# Patient Record
Sex: FEMALE | Race: WHITE | NOT HISPANIC OR LATINO | URBAN - METROPOLITAN AREA
[De-identification: names, ages, dates, MRNs, and addresses within clinical notes are randomized per-mention and may not be internally consistent; named-entity substitution may affect disease eponyms.]

---

## 2018-03-30 ENCOUNTER — EMERGENCY (EMERGENCY)
Facility: HOSPITAL | Age: 23
LOS: 1 days | End: 2018-03-30
Payer: COMMERCIAL

## 2018-03-30 PROCEDURE — 71046 X-RAY EXAM CHEST 2 VIEWS: CPT | Mod: 26

## 2018-03-30 PROCEDURE — 99284 EMERGENCY DEPT VISIT MOD MDM: CPT

## 2018-12-30 ENCOUNTER — EMERGENCY (EMERGENCY)
Facility: HOSPITAL | Age: 23
LOS: 7 days | Discharge: ANOTHER HOSPITAL | End: 2019-01-07
Payer: MEDICAID

## 2018-12-30 PROCEDURE — 99285 EMERGENCY DEPT VISIT HI MDM: CPT

## 2018-12-31 ENCOUNTER — EMERGENCY (EMERGENCY)
Facility: HOSPITAL | Age: 23
LOS: 1 days | Discharge: LEFT BEFORE DISCHARGE | End: 2018-12-31
Attending: STUDENT IN AN ORGANIZED HEALTH CARE EDUCATION/TRAINING PROGRAM

## 2018-12-31 VITALS
DIASTOLIC BLOOD PRESSURE: 73 MMHG | RESPIRATION RATE: 18 BRPM | OXYGEN SATURATION: 97 % | HEART RATE: 96 BPM | SYSTOLIC BLOOD PRESSURE: 119 MMHG | TEMPERATURE: 98 F

## 2018-12-31 PROCEDURE — 76815 OB US LIMITED FETUS(S): CPT | Mod: 26

## 2018-12-31 PROCEDURE — 76770 US EXAM ABDO BACK WALL COMP: CPT | Mod: 26

## 2018-12-31 NOTE — ED PROVIDER NOTE - MEDICAL DECISION MAKING DETAILS
went to evaluate pt, pt was not in bed when I arrived to bedside. went to evaluate pt, pt was not in bed when I arrived to bedside. Spoke with Nurse Salima whom states the patient informed her that she has been pain free prior to transfer and just wants to go home and follow-up with ob/gyn within 24-48 hours. Patient declined awaiting to speak to me.

## 2018-12-31 NOTE — ED ADULT NURSE REASSESSMENT NOTE - NS ED NURSE REASSESS COMMENT FT1
Pt states "I don't want to wait here anymore, I waited at [facility] for over 5 hours and I just want to go home and sleep, im in no pain and I can handle this tomorrow", IV access removed, pt AOx4, ambulates steadily w/out assistance, MD Tejada aware, pt left w/ [facility] paperwork.

## 2023-08-25 PROBLEM — Z00.00 ENCOUNTER FOR PREVENTIVE HEALTH EXAMINATION: Status: ACTIVE | Noted: 2023-08-25

## 2023-09-28 ENCOUNTER — APPOINTMENT (OUTPATIENT)
Dept: RHEUMATOLOGY | Facility: CLINIC | Age: 28
End: 2023-09-28
Payer: MEDICAID

## 2023-09-28 ENCOUNTER — NON-APPOINTMENT (OUTPATIENT)
Age: 28
End: 2023-09-28

## 2023-09-28 VITALS
WEIGHT: 110 LBS | OXYGEN SATURATION: 98 % | DIASTOLIC BLOOD PRESSURE: 79 MMHG | BODY MASS INDEX: 16.67 KG/M2 | SYSTOLIC BLOOD PRESSURE: 115 MMHG | HEIGHT: 68 IN | RESPIRATION RATE: 16 BRPM | HEART RATE: 110 BPM

## 2023-09-28 DIAGNOSIS — G89.29 OTHER CHRONIC PAIN: ICD-10-CM

## 2023-09-28 DIAGNOSIS — M79.10 MYALGIA, UNSPECIFIED SITE: ICD-10-CM

## 2023-09-28 DIAGNOSIS — I95.1 ORTHOSTATIC HYPOTENSION: ICD-10-CM

## 2023-09-28 DIAGNOSIS — R10.9 UNSPECIFIED ABDOMINAL PAIN: ICD-10-CM

## 2023-09-28 DIAGNOSIS — S73.003A UNSPECIFIED SUBLUXATION OF UNSPECIFIED HIP, INITIAL ENCOUNTER: ICD-10-CM

## 2023-09-28 DIAGNOSIS — R55 DIZZINESS AND GIDDINESS: ICD-10-CM

## 2023-09-28 DIAGNOSIS — R42 DIZZINESS AND GIDDINESS: ICD-10-CM

## 2023-09-28 DIAGNOSIS — Z86.69 PERSONAL HISTORY OF OTHER DISEASES OF THE NERVOUS SYSTEM AND SENSE ORGANS: ICD-10-CM

## 2023-09-28 DIAGNOSIS — R29.898 OTHER SYMPTOMS AND SIGNS INVOLVING THE MUSCULOSKELETAL SYSTEM: ICD-10-CM

## 2023-09-28 DIAGNOSIS — Z87.2 PERSONAL HISTORY OF DISEASES OF THE SKIN AND SUBCUTANEOUS TISSUE: ICD-10-CM

## 2023-09-28 DIAGNOSIS — R70.0 ELEVATED ERYTHROCYTE SEDIMENTATION RATE: ICD-10-CM

## 2023-09-28 DIAGNOSIS — Z87.898 PERSONAL HISTORY OF OTHER SPECIFIED CONDITIONS: ICD-10-CM

## 2023-09-28 PROCEDURE — 99205 OFFICE O/P NEW HI 60 MIN: CPT | Mod: 25

## 2023-09-28 PROCEDURE — 36415 COLL VENOUS BLD VENIPUNCTURE: CPT

## 2023-09-28 RX ORDER — MIDODRINE HYDROCHLORIDE 5 MG/1
5 TABLET ORAL 3 TIMES DAILY
Refills: 0 | Status: ACTIVE | COMMUNITY

## 2023-09-29 LAB
ALBUMIN SERPL ELPH-MCNC: 5.1 G/DL
ALP BLD-CCNC: 66 U/L
ALT SERPL-CCNC: 32 U/L
ANION GAP SERPL CALC-SCNC: 11 MMOL/L
AST SERPL-CCNC: 28 U/L
BASOPHILS # BLD AUTO: 0.06 K/UL
BASOPHILS NFR BLD AUTO: 0.5 %
BILIRUB SERPL-MCNC: 0.3 MG/DL
BUN SERPL-MCNC: 12 MG/DL
CALCIUM SERPL-MCNC: 9.9 MG/DL
CHLORIDE SERPL-SCNC: 103 MMOL/L
CK SERPL-CCNC: 115 U/L
CO2 SERPL-SCNC: 27 MMOL/L
CREAT SERPL-MCNC: 0.73 MG/DL
CRP SERPL-MCNC: <3 MG/L
EGFR: 115 ML/MIN/1.73M2
EOSINOPHIL # BLD AUTO: 0.32 K/UL
EOSINOPHIL NFR BLD AUTO: 2.8 %
ERYTHROCYTE [SEDIMENTATION RATE] IN BLOOD BY WESTERGREN METHOD: 12 MM/HR
GLUCOSE SERPL-MCNC: 80 MG/DL
HCT VFR BLD CALC: 41.4 %
HGB BLD-MCNC: 13.7 G/DL
IMM GRANULOCYTES NFR BLD AUTO: 0.6 %
LDH SERPL-CCNC: 189 U/L
LYMPHOCYTES # BLD AUTO: 2.91 K/UL
LYMPHOCYTES NFR BLD AUTO: 25.4 %
MAN DIFF?: NORMAL
MCHC RBC-ENTMCNC: 31 PG
MCHC RBC-ENTMCNC: 33.1 GM/DL
MCV RBC AUTO: 93.7 FL
MONOCYTES # BLD AUTO: 0.57 K/UL
MONOCYTES NFR BLD AUTO: 5 %
NEUTROPHILS # BLD AUTO: 7.54 K/UL
NEUTROPHILS NFR BLD AUTO: 65.7 %
PLATELET # BLD AUTO: 355 K/UL
POTASSIUM SERPL-SCNC: 4 MMOL/L
PROT SERPL-MCNC: 7.7 G/DL
RBC # BLD: 4.42 M/UL
RBC # FLD: 11.8 %
SODIUM SERPL-SCNC: 141 MMOL/L
WBC # FLD AUTO: 11.47 K/UL

## 2023-09-30 LAB
A-TUMOR NECROSIS FACT SERPL-MCNC: <1.7 PG/ML
IGNF SERPL-MCNC: <4.2 PG/ML
IL10 SERPL-MCNC: <2.8 PG/ML
IL12 SERPL-MCNC: <1.9 PG/ML
IL13 SERPL-MCNC: 3 PG/ML
IL17A SERPL-MCNC: <1.4 PG/ML
IL2 SERPL-MCNC: 673.5 PG/ML
IL2 SERPL-MCNC: <2.1 PG/ML
IL4 SERPL-MCNC: <2.2 PG/ML
IL6 SERPL-MCNC: <2 PG/ML
IL8 SERPL-MCNC: <3 PG/ML
INTERLEUKIN 1 BETA: <6.5 PG/ML
INTERLEUKIN 5: <2.1 PG/ML

## 2023-10-02 LAB
ALBUMIN MFR SERPL ELPH: 59.5 %
ALBUMIN SERPL-MCNC: 4.3 G/DL
ALBUMIN/GLOB SERPL: 1.5 RATIO
ALPHA1 GLOB MFR SERPL ELPH: 4.5 %
ALPHA1 GLOB SERPL ELPH-MCNC: 0.3 G/DL
ALPHA2 GLOB MFR SERPL ELPH: 10.2 %
ALPHA2 GLOB SERPL ELPH-MCNC: 0.7 G/DL
B-GLOBULIN MFR SERPL ELPH: 11.9 %
B-GLOBULIN SERPL ELPH-MCNC: 0.9 G/DL
DEPRECATED KAPPA LC FREE/LAMBDA SER: 1.94 RATIO
GAMMA GLOB FLD ELPH-MCNC: 1 G/DL
GAMMA GLOB MFR SERPL ELPH: 13.9 %
IGA SER QL IEP: 236 MG/DL
IGG SER QL IEP: 1047 MG/DL
IGM SER QL IEP: 112 MG/DL
INTERPRETATION SERPL IEP-IMP: NORMAL
KAPPA LC CSF-MCNC: 1.43 MG/DL
KAPPA LC SERPL-MCNC: 2.78 MG/DL
M PROTEIN SPEC IFE-MCNC: NORMAL
PROT SERPL-MCNC: 7.2 G/DL
PROT SERPL-MCNC: 7.2 G/DL

## 2023-10-03 LAB — HISTONE AB SER QL: 0.3 UNITS

## 2023-10-04 LAB
ANTI-BETA2 GLYCOPROTEIN 1 IGA CONCENTRATION: 2 U/ML
ANTI-BETA2 GLYCOPROTEIN 1 IGG CONCENTRATION: 1 U/ML
ANTI-BETA2 GLYCOPROTEIN 1 IGG CONCENTRATION: 1 U/ML
ANTI-BETA2 GLYCOPROTEIN 1 IGM CONCENTRATION: 3 U/ML
ANTI-BETA2 GLYCOPROTEIN 1 IGM CONCENTRATION: 3 U/ML
ANTI-C1Q IGG CONCENTRATION: <3 UNITS
ANTI-CARDIOLIPIN IGA CONCENTRATION: 3 APL
ANTI-CARDIOLIPIN IGG CONCENTRATION: 1 GPL
ANTI-CARDIOLIPIN IGG CONCENTRATION: 1 GPL
ANTI-CARDIOLIPIN IGM CONCENTRATION: 3 MPL
ANTI-CARDIOLIPIN IGM CONCENTRATION: 3 MPL
ANTI-CENP IGG CONCENTRATION: <0.4 U/ML
ANTI-CYCLIC CITRULLINATED PEPTIDE IGG CONCENTRATION: 1 U/ML
ANTI-DOUBLE-STRANDED DNA IGG CONCENTRATION: 19 IU/ML
ANTI-JO-1 IGG CONCENTRATION: <0.3 U/ML
ANTI-NUCLEAR ANTIBODIES - CYTOPLASMIC PATTERN: NORMAL
ANTI-NUCLEAR ANTIBODIES - PRIMARY NUCLEAR PATTERN: NORMAL
ANTI-NUCLEAR ANTIBODIES - PRIMARY PATTERN TITER: NEGATIVE
ANTI-NUCLEAR ANTIBODIES IGG CONCENTRATION: 8 UNITS
ANTI-PHOSPHATIDYLSERINE/PROTHROMBIN IGG CONCENTRATION: 5 UNITS
ANTI-PHOSPHATIDYLSERINE/PROTHROMBIN IGM CONCENTRATION: 32 UNITS
ANTI-RIBOSOMAL P IGG CONCENTRATION: 1 U/ML
ANTI-RNA POL III IGG CONCENTRATION: 1 U/ML
ANTI-RNP70 IGG CONCENTRATION: 1 U/ML
ANTI-RO52 IGG CONCENTRATION: <0.3 U/ML
ANTI-RO60 IGG CONCENTRATION: <0.4 U/ML
ANTI-SCL-70 IGG CONCENTRATION: <0.6 U/ML
ANTI-SMITH IGG CONCENTRATION: <0.7 U/ML
ANTI-SS-B (LA) IGG CONCENTRATION: <0.4 U/ML
ANTI-THYROGLOBULIN IGG CONCENTRATION: <12 IU/ML
ANTI-THYROID PEROXIDASE IGG CONCENTRATION: <4 IU/ML
ANTI-U1RNP IGG CONCENTRATION: 1 U/ML
AVISE LUPUS INDEX: -2
AVISE LUPUS RESULT: NEGATIVE
B-LYMPHOCYTE-BOUND C4D (BC4D) LEVEL: 17
ERYTHROCYTE-BOUND C4D (EC4D) LEVEL: 3
RHEUMATOID FACTOR (IGA) CONCENTRATION: 2 IU/ML
RHEUMATOID FACTOR (IGM) CONCENTRATION: 1 IU/ML

## 2023-10-05 LAB
CA VI IGA AB: 5.3 EU/ML
CA VI IGG AB: 1.6 EU/ML
CA VI IGM AB: 8.8 EU/ML
PSP IGA AB: 3 EU/ML
PSP IGG AB: 3.5 EU/ML
PSP IGM AB: 6.7 EU/ML
SEROLOGY COMMENTS: NORMAL
SP-1 IGA AB: NORMAL
SP-1 IGG AB: 2.4 EU/ML
SP-1 IGM AB: 8.7 EU/ML

## 2023-10-07 LAB
CENP-A: <11 SI
CENP-B: <11 SI
FIBRILLARIN: <11 SI
PM/SCL-100: <11 SI
PM/SCL-75: <11 SI
RP11: <11 SI
RP155: <11 SI
SCL-70: <11 SI
TH/TO: <11 SI
U1-SNRNP RNP A: <11 SI
U1-SNRNP RNP C: <11 SI
U1-SNRNP RNP-70KD: <11 SI

## 2023-10-19 LAB
COLLAGEN TYPE II: 9.8 EU/ML
EJ AB SER QL: NEGATIVE
ENA JO1 AB SER IA-ACNC: <20 UNITS
ENA PM/SCL AB SER-ACNC: <20 UNITS
ENA SM+RNP AB SER IA-ACNC: <20 UNITS
ENA SS-A IGG SER QL: <20 UNITS
FIBRILLARIN AB SER QL: NEGATIVE
KU AB SER QL: NEGATIVE
MDA-5 (P140)(CADM-140): <20 UNITS
MI2 AB SER QL: NEGATIVE
NXP-2 (P140): <20 UNITS
OJ AB SER QL: NEGATIVE
PL12 AB SER QL: NEGATIVE
PL7 AB SER QL: NEGATIVE
SRP AB SERPL QL: NEGATIVE
TIF GAMMA (P155/140): <20 UNITS
U2 SNRNP AB SER QL: NEGATIVE

## 2023-11-07 ENCOUNTER — APPOINTMENT (OUTPATIENT)
Dept: RHEUMATOLOGY | Facility: CLINIC | Age: 28
End: 2023-11-07
Payer: MEDICAID

## 2023-11-07 VITALS
WEIGHT: 110 LBS | SYSTOLIC BLOOD PRESSURE: 122 MMHG | DIASTOLIC BLOOD PRESSURE: 86 MMHG | OXYGEN SATURATION: 100 % | HEART RATE: 88 BPM | HEIGHT: 68 IN | BODY MASS INDEX: 16.67 KG/M2

## 2023-11-07 DIAGNOSIS — M25.50 PAIN IN UNSPECIFIED JOINT: ICD-10-CM

## 2023-11-07 DIAGNOSIS — M35.7 HYPERMOBILITY SYNDROME: ICD-10-CM

## 2023-11-07 DIAGNOSIS — G90.1 FAMILIAL DYSAUTONOMIA [RILEY-DAY]: ICD-10-CM

## 2023-11-07 DIAGNOSIS — Q79.62 HYPERMOBILE EHLERS-DANLOS SYNDROME: ICD-10-CM

## 2023-11-07 DIAGNOSIS — R53.82 CHRONIC FATIGUE, UNSPECIFIED: ICD-10-CM

## 2023-11-07 DIAGNOSIS — M79.18 MYALGIA, OTHER SITE: ICD-10-CM

## 2023-11-07 DIAGNOSIS — K31.84 GASTROPARESIS: ICD-10-CM

## 2023-11-07 DIAGNOSIS — G90.A POSTURAL ORTHOSTATIC TACHYCARDIA SYNDROME [POTS]: ICD-10-CM

## 2023-11-07 DIAGNOSIS — S83.003A UNSPECIFIED SUBLUXATION OF UNSPECIFIED PATELLA, INITIAL ENCOUNTER: ICD-10-CM

## 2023-11-07 DIAGNOSIS — R53.81 CHRONIC FATIGUE, UNSPECIFIED: ICD-10-CM

## 2023-11-07 DIAGNOSIS — G93.5 COMPRESSION OF BRAIN: ICD-10-CM

## 2023-11-07 PROCEDURE — 99215 OFFICE O/P EST HI 40 MIN: CPT | Mod: 25

## 2023-11-07 PROCEDURE — 36415 COLL VENOUS BLD VENIPUNCTURE: CPT

## 2023-12-06 PROBLEM — K31.84 SLOW GASTRIC MOTILITY: Status: ACTIVE | Noted: 2023-09-28

## 2023-12-06 PROBLEM — G93.5 CHIARI MALFORMATION TYPE I: Status: ACTIVE | Noted: 2023-09-28

## 2023-12-06 PROBLEM — R53.82 CHRONIC FATIGUE AND MALAISE: Status: ACTIVE | Noted: 2023-09-28

## 2023-12-06 PROBLEM — G90.A POTS (POSTURAL ORTHOSTATIC TACHYCARDIA SYNDROME): Status: ACTIVE | Noted: 2023-09-28

## 2023-12-06 PROBLEM — M25.50 POLYARTHRALGIA: Status: ACTIVE | Noted: 2023-09-28

## 2023-12-06 PROBLEM — M35.7 HYPERMOBILITY SYNDROME: Status: ACTIVE | Noted: 2023-09-28

## 2023-12-06 PROBLEM — Q79.62 HYPERMOBILE EHLERS-DANLOS SYNDROME: Status: ACTIVE | Noted: 2023-12-06

## 2023-12-06 PROBLEM — S83.003A SUBLUXATION OF PATELLA: Status: ACTIVE | Noted: 2023-09-28

## 2023-12-06 PROBLEM — M25.50 HYPERMOBILITY ARTHRALGIA: Status: ACTIVE | Noted: 2023-09-28

## 2023-12-06 PROBLEM — M79.18 MYOFASCIAL PAIN: Status: ACTIVE | Noted: 2023-09-28

## 2023-12-06 PROBLEM — G90.1 DYSAUTONOMIA: Status: ACTIVE | Noted: 2023-09-28

## 2023-12-14 LAB
MISCELLANEOUS TEST: NORMAL
PROC NAME: NORMAL